# Patient Record
Sex: FEMALE | Race: WHITE | NOT HISPANIC OR LATINO | Employment: FULL TIME | URBAN - METROPOLITAN AREA
[De-identification: names, ages, dates, MRNs, and addresses within clinical notes are randomized per-mention and may not be internally consistent; named-entity substitution may affect disease eponyms.]

---

## 2018-10-08 ENCOUNTER — TRANSFERRED RECORDS (OUTPATIENT)
Dept: HEALTH INFORMATION MANAGEMENT | Facility: CLINIC | Age: 48
End: 2018-10-08

## 2018-10-10 ENCOUNTER — TELEPHONE (OUTPATIENT)
Dept: SLEEP MEDICINE | Facility: HOSPITAL | Age: 48
End: 2018-10-10

## 2018-10-16 ENCOUNTER — OFFICE VISIT (OUTPATIENT)
Dept: SLEEP MEDICINE | Facility: HOSPITAL | Age: 48
End: 2018-10-16
Attending: INTERNAL MEDICINE
Payer: COMMERCIAL

## 2018-10-16 DIAGNOSIS — R09.02 HYPOXIA: Primary | ICD-10-CM

## 2018-10-16 NOTE — PROGRESS NOTES
Patient picked up over night oximeter numberSL-2. Patient was instructed on use of device. Patient verbalized understanding.     Patient will return device tomorrow by: noon    Patient picked this up in [place]

## 2018-10-16 NOTE — MR AVS SNAPSHOT
"              After Visit Summary   10/16/2018    Michelle Cabrera    MRN: [id number]           Patient Information     Date Of Birth          1970        Visit Information        Provider Department      10/16/2018 11:15 AM [facility]        Today's Diagnoses     Hypoxia    -  1       Follow-ups after your visit        Your next 10 appointments already scheduled     Oct 17, 2018 11:00 AM CDT   Oximetry Drop Off with [facility]   [facility] ([facility] )    [address] [place] [id number]   [phone]              Who to contact     If you have questions or need follow up information about today's clinic visit or your schedule please contact [facility] directly at [phone].  Normal or non-critical lab and imaging results will be communicated to you by [employer], letter or phone within 4 business days after the clinic has received the results. If you do not hear from us within 7 days, please contact the clinic through [employer] or phone. If you have a critical or abnormal lab result, we will notify you by phone as soon as possible.  Submit refill requests through [employer] or call your pharmacy and they will forward the refill request to us. Please allow 3 business days for your refill to be completed.          Additional Information About Your Visit        [employer] Information     [employer] lets you send messages to your doctor, view your test results, renew your prescriptions, schedule appointments and more. To sign up, go to [email or web address] . Click on \"Log in\" on the left side of the screen, which will take you to the Welcome page. Then click on \"Sign up Now\" on the right side of the page.     You will be asked to enter the access code listed below, as well as some personal information. Please follow the directions to create your username and password.     Your access code is: [email or web address]  Expires: 2019 11:52 AM     Your access code will  in 90 days. " If you need help or a new code, please call your [facility] clinic or [phone].        Care EveryWhere ID     This is your Care EveryWhere ID. This could be used by other organizations to access your [facility] medical records  [id number]         Blood Pressure from Last 3 Encounters:   No data found for BP    Weight from Last 3 Encounters:   No data found for Wt              Today, you had the following     No orders found for display       Primary Care Provider Office Phone # Fax #    Cynthia Dailey PA-C [phone]       [facility] [address] [place]        Equal Access to Services     [facility]: Hadii aad ku hadasho Soomaali, waaxda luqadaha, qaybta kaalmada adeegyada, dakota ludwig adeeg candy soriano . So [facility] [phone].    ATENCIÓN: Si habla español, tiene a contreras disposición servicios gratuitos de asistencia lingüística. Llame al [phone].    We comply with applicable federal civil rights laws and [place] laws. We do not discriminate on the basis of race, color, national origin, age, disability, sex, sexual orientation, or gender identity.            Thank you!     Thank you for choosing [facility]  for your care. Our goal is always to provide you with excellent care. Hearing back from our patients is one way we can continue to improve our services. Please take a few minutes to complete the written survey that you may receive in the mail after your visit with us. Thank you!             Your Updated Medication List - Protect others around you: Learn how to safely use, store and throw away your medicines at www.disposemymeds.org.      Notice  As of 10/16/2018 11:52 AM    You have not been prescribed any medications.

## 2018-10-23 ENCOUNTER — DOCUMENTATION ONLY (OUTPATIENT)
Dept: SLEEP MEDICINE | Facility: HOSPITAL | Age: 48
End: 2018-10-23
Attending: INTERNAL MEDICINE
Payer: COMMERCIAL

## 2018-10-23 DIAGNOSIS — R09.02 HYPOXIA: Primary | ICD-10-CM

## 2018-10-23 NOTE — PROGRESS NOTES
Patient returned the oximeeter and the data was downloaded and the report sent to scan and to Dr. Flores

## 2021-07-28 ENCOUNTER — MEDICAL CORRESPONDENCE (OUTPATIENT)
Dept: INTERVENTIONAL RADIOLOGY/VASCULAR | Facility: HOSPITAL | Age: 51
End: 2021-07-28

## 2021-08-10 ENCOUNTER — HOSPITAL ENCOUNTER (OUTPATIENT)
Facility: HOSPITAL | Age: 51
End: 2021-08-10
Attending: RADIOLOGY | Admitting: RADIOLOGY
Payer: COMMERCIAL

## 2021-09-10 ENCOUNTER — HOSPITAL ENCOUNTER (OUTPATIENT)
Dept: INTERVENTIONAL RADIOLOGY/VASCULAR | Facility: HOSPITAL | Age: 51
Discharge: HOME | End: 2021-09-10
Attending: PAIN MEDICINE | Admitting: RADIOLOGY
Payer: COMMERCIAL

## 2021-09-10 DIAGNOSIS — M54.50 LOW BACK PAIN: ICD-10-CM

## 2021-09-10 PROCEDURE — 250N000011 HC RX IP 250 OP 636: Performed by: RADIOLOGY

## 2021-09-10 PROCEDURE — 64494 INJ PARAVERT F JNT L/S 2 LEV: CPT | Mod: 50

## 2021-09-10 PROCEDURE — 250N000009 HC RX 250: Performed by: RADIOLOGY

## 2021-09-10 RX ORDER — ROPIVACAINE HYDROCHLORIDE 10 MG/ML
10 INJECTION EPIDURAL; INFILTRATION; PERINEURAL ONCE
Status: COMPLETED | OUTPATIENT
Start: 2021-09-10 | End: 2021-09-10

## 2021-09-10 RX ADMIN — ROPIVACAINE HYDROCHLORIDE 6 ML: 10 INJECTION, SOLUTION EPIDURAL at 14:28

## 2021-09-10 RX ADMIN — LIDOCAINE HYDROCHLORIDE 10 ML: 10 INJECTION, SOLUTION EPIDURAL; INFILTRATION; INTRACAUDAL; PERINEURAL at 14:27

## 2021-09-10 NOTE — PROGRESS NOTES
Medial Branch Blocks Workup Discharge Instructions    To obtain authorization for a Rhizotomy (Radiofrequency Ablation), we are required to document the percent of relief of the Medial Branch Block injection after one hour.  Therefore, please call and report this to us an hour after your procedure.  If no one is available to answer, it will go to voicemail:  Please leave your name, phone number, and the relief you have obtained from your injection.        Report the percentage of pain relief you received from your medial branch block.   Such as 100%, 90%, 80%, 70%, 60%, 50% etc.   Please do not leave a range of relief; we require a specific percentage.   Without this information, your injection pain relief will not be documented as required for your insurance company. Calling is a crucial step of the Medial Branch Block injection and Rhizotomy workup.      Please call [phone] at 3:25 PM    Thank you,  The Interventional Care Team

## 2021-09-17 RX ORDER — LOSARTAN POTASSIUM AND HYDROCHLOROTHIAZIDE 12.5; 1 MG/1; MG/1
1 TABLET ORAL
COMMUNITY

## 2021-09-17 RX ORDER — DESOGESTREL AND ETHINYL ESTRADIOL 0.15-0.03
KIT ORAL
COMMUNITY
Start: 2021-08-09

## 2021-09-30 ENCOUNTER — HOSPITAL ENCOUNTER (OUTPATIENT)
Dept: INTERVENTIONAL RADIOLOGY/VASCULAR | Facility: HOSPITAL | Age: 51
End: 2021-09-30
Attending: PAIN MEDICINE | Admitting: RADIOLOGY
Payer: COMMERCIAL

## 2021-09-30 ENCOUNTER — HOSPITAL ENCOUNTER (OUTPATIENT)
Facility: HOSPITAL | Age: 51
Discharge: HOME | End: 2021-09-30
Attending: RADIOLOGY | Admitting: RADIOLOGY
Payer: COMMERCIAL

## 2021-09-30 DIAGNOSIS — M54.50 LOW BACK PAIN: ICD-10-CM

## 2021-09-30 PROCEDURE — 250N000009 HC RX 250: Performed by: RADIOLOGY

## 2021-09-30 PROCEDURE — 250N000011 HC RX IP 250 OP 636: Performed by: RADIOLOGY

## 2021-09-30 PROCEDURE — 64494 INJ PARAVERT F JNT L/S 2 LEV: CPT

## 2021-09-30 RX ORDER — LIDOCAINE 40 MG/G
CREAM TOPICAL ONCE
Status: COMPLETED | OUTPATIENT
Start: 2021-09-30 | End: 2021-09-30

## 2021-09-30 RX ORDER — ROPIVACAINE HYDROCHLORIDE 10 MG/ML
10 INJECTION EPIDURAL; INFILTRATION; PERINEURAL ONCE
Status: COMPLETED | OUTPATIENT
Start: 2021-09-30 | End: 2021-09-30

## 2021-09-30 RX ADMIN — LIDOCAINE HYDROCHLORIDE 8 ML: 10 INJECTION, SOLUTION EPIDURAL; INFILTRATION; INTRACAUDAL; PERINEURAL at 14:15

## 2021-09-30 RX ADMIN — LIDOCAINE 5 APPLICATION.: 40 CREAM TOPICAL at 14:15

## 2021-09-30 RX ADMIN — ROPIVACAINE HYDROCHLORIDE 6 ML: 10 INJECTION, SOLUTION EPIDURAL at 14:16

## 2021-09-30 NOTE — PROGRESS NOTES
Medial Branch Blocks Workup Discharge Instructions    To obtain authorization for a Rhizotomy (Radiofrequency Ablation), we are required to document the percent of relief of the Medial Branch Block injection after one hour.  Therefore, please call and report this to us an hour after your procedure.  If no one is available to answer, it will go to voicemail:  Please leave your name, phone number, and the relief you have obtained from your injection.        Report the percentage of pain relief you received from your medial branch block.   Such as 100%, 90%, 80%, 70%, 60%, 50% etc.   Please do not leave a range of relief; we require a specific percentage.   Without this information, your injection pain relief will not be documented as required for your insurance company. Calling is a crucial step of the Medial Branch Block injection and Rhizotomy workup.      Please call [phone] at 3:15 PM    Thank you,  The Interventional Care Team

## 2021-10-07 ENCOUNTER — TRANSFERRED RECORDS (OUTPATIENT)
Dept: HEALTH INFORMATION MANAGEMENT | Facility: HOSPITAL | Age: 51
End: 2021-10-07

## 2021-10-07 LAB
ALT SERPL-CCNC: 75 U/L (ref 18–65)
AST SERPL-CCNC: 82 U/L (ref 10–30)
CHOLESTEROL (EXTERNAL): 193 MG/DL
CREATININE (EXTERNAL): 0.79 MG/DL (ref 0.7–1.2)
GFR ESTIMATED (EXTERNAL): >60 ML/MIN/1.73M2
GLUCOSE (EXTERNAL): 91 MG/DL (ref 60–99)
HDLC SERPL-MCNC: 49 MG/DL
LDL CHOLESTEROL CALCULATED (EXTERNAL): 117 MG/DL
POTASSIUM (EXTERNAL): 4.7 MMOL/L (ref 3.5–5.1)
TRIGLYCERIDES (EXTERNAL): 136 MG/DL
TSH SERPL-ACNC: 2.58 MIU/ML (ref 0.35–4.8)

## 2021-10-11 ENCOUNTER — TRANSFERRED RECORDS (OUTPATIENT)
Dept: HEALTH INFORMATION MANAGEMENT | Facility: HOSPITAL | Age: 51
End: 2021-10-11

## 2021-10-13 ENCOUNTER — ANESTHESIA EVENT (OUTPATIENT)
Dept: SURGERY | Facility: HOSPITAL | Age: 51
End: 2021-10-13
Payer: COMMERCIAL

## 2021-10-13 ENCOUNTER — DOCUMENTATION ONLY (OUTPATIENT)
Dept: INTERVENTIONAL RADIOLOGY/VASCULAR | Facility: HOSPITAL | Age: 51
End: 2021-10-13

## 2021-10-13 RX ORDER — SODIUM CHLORIDE 9 MG/ML
INJECTION, SOLUTION INTRAVENOUS CONTINUOUS
Status: CANCELLED | OUTPATIENT
Start: 2021-10-13

## 2021-10-13 RX ORDER — SODIUM CHLORIDE, SODIUM LACTATE, POTASSIUM CHLORIDE, CALCIUM CHLORIDE 600; 310; 30; 20 MG/100ML; MG/100ML; MG/100ML; MG/100ML
1000 INJECTION, SOLUTION INTRAVENOUS CONTINUOUS
Status: CANCELLED | OUTPATIENT
Start: 2021-10-13

## 2021-10-13 RX ORDER — LIDOCAINE 40 MG/G
CREAM TOPICAL
Status: CANCELLED | OUTPATIENT
Start: 2021-10-13

## 2021-10-14 ENCOUNTER — APPOINTMENT (OUTPATIENT)
Dept: LAB | Facility: HOSPITAL | Age: 51
End: 2021-10-14
Attending: RADIOLOGY
Payer: COMMERCIAL

## 2021-10-14 ENCOUNTER — HOSPITAL ENCOUNTER (OUTPATIENT)
Dept: INTERVENTIONAL RADIOLOGY/VASCULAR | Facility: HOSPITAL | Age: 51
End: 2021-10-14
Attending: PAIN MEDICINE | Admitting: RADIOLOGY
Payer: COMMERCIAL

## 2021-10-14 ENCOUNTER — HOSPITAL ENCOUNTER (OUTPATIENT)
Facility: HOSPITAL | Age: 51
Discharge: HOME | End: 2021-10-14
Attending: RADIOLOGY | Admitting: RADIOLOGY
Payer: COMMERCIAL

## 2021-10-14 ENCOUNTER — ANESTHESIA (OUTPATIENT)
Dept: SURGERY | Facility: HOSPITAL | Age: 51
End: 2021-10-14
Payer: COMMERCIAL

## 2021-10-14 VITALS
OXYGEN SATURATION: 97 % | HEART RATE: 83 BPM | WEIGHT: 293 LBS | TEMPERATURE: 97.5 F | RESPIRATION RATE: 18 BRPM | BODY MASS INDEX: 47.09 KG/M2 | HEIGHT: 66 IN | DIASTOLIC BLOOD PRESSURE: 70 MMHG | SYSTOLIC BLOOD PRESSURE: 141 MMHG

## 2021-10-14 VITALS — HEART RATE: 80 BPM

## 2021-10-14 DIAGNOSIS — M47.816 SPONDYLOSIS OF LUMBAR REGION WITHOUT MYELOPATHY OR RADICULOPATHY: Primary | ICD-10-CM

## 2021-10-14 DIAGNOSIS — M54.50 LOW BACK PAIN: ICD-10-CM

## 2021-10-14 PROCEDURE — 272N000228 XR LUMBAR RADIOFREQ ABLATION BILATERAL

## 2021-10-14 PROCEDURE — 258N000003 HC RX IP 258 OP 636: Performed by: RADIOLOGY

## 2021-10-14 PROCEDURE — 999N000141 HC STATISTIC PRE-PROCEDURE NURSING ASSESSMENT

## 2021-10-14 PROCEDURE — 250N000011 HC RX IP 250 OP 636: Performed by: RADIOLOGY

## 2021-10-14 PROCEDURE — 250N000011 HC RX IP 250 OP 636: Performed by: NURSE ANESTHETIST, CERTIFIED REGISTERED

## 2021-10-14 PROCEDURE — 250N000009 HC RX 250: Performed by: NURSE ANESTHETIST, CERTIFIED REGISTERED

## 2021-10-14 PROCEDURE — 370N000017 HC ANESTHESIA TECHNICAL FEE, PER MIN

## 2021-10-14 PROCEDURE — 96372 THER/PROPH/DIAG INJ SC/IM: CPT | Performed by: RADIOLOGY

## 2021-10-14 PROCEDURE — 250N000009 HC RX 250: Performed by: RADIOLOGY

## 2021-10-14 PROCEDURE — 710N000012 HC RECOVERY PHASE 2, PER MINUTE

## 2021-10-14 PROCEDURE — 64636 DESTROY L/S FACET JNT ADDL: CPT

## 2021-10-14 PROCEDURE — 64635 DESTROY LUMB/SAC FACET JNT: CPT | Performed by: NURSE ANESTHETIST, CERTIFIED REGISTERED

## 2021-10-14 RX ORDER — MEPERIDINE HYDROCHLORIDE 25 MG/ML
12.5 INJECTION INTRAMUSCULAR; INTRAVENOUS; SUBCUTANEOUS
Status: DISCONTINUED | OUTPATIENT
Start: 2021-10-14 | End: 2021-10-14 | Stop reason: HOSPADM

## 2021-10-14 RX ORDER — LIDOCAINE HYDROCHLORIDE 10 MG/ML
INJECTION, SOLUTION EPIDURAL; INFILTRATION; INTRACAUDAL; PERINEURAL
Status: DISPENSED
Start: 2021-10-14 | End: 2021-10-14

## 2021-10-14 RX ORDER — ONDANSETRON 4 MG/1
4 TABLET, ORALLY DISINTEGRATING ORAL EVERY 30 MIN PRN
Status: DISCONTINUED | OUTPATIENT
Start: 2021-10-14 | End: 2021-10-14 | Stop reason: HOSPADM

## 2021-10-14 RX ORDER — LIDOCAINE 40 MG/G
CREAM TOPICAL
Status: DISCONTINUED | OUTPATIENT
Start: 2021-10-14 | End: 2021-10-14 | Stop reason: HOSPADM

## 2021-10-14 RX ORDER — OXYCODONE AND ACETAMINOPHEN 5; 325 MG/1; MG/1
1-2 TABLET ORAL EVERY 6 HOURS PRN
Qty: 40 TABLET | Refills: 0 | Status: SHIPPED | OUTPATIENT
Start: 2021-10-14 | End: 2021-10-19

## 2021-10-14 RX ORDER — METHYLPREDNISOLONE ACETATE 80 MG/ML
160 INJECTION, SUSPENSION INTRA-ARTICULAR; INTRALESIONAL; INTRAMUSCULAR; SOFT TISSUE ONCE
Status: COMPLETED | OUTPATIENT
Start: 2021-10-14 | End: 2021-10-14

## 2021-10-14 RX ORDER — SODIUM CHLORIDE 9 MG/ML
INJECTION, SOLUTION INTRAVENOUS CONTINUOUS
Status: DISCONTINUED | OUTPATIENT
Start: 2021-10-14 | End: 2021-10-14 | Stop reason: HOSPADM

## 2021-10-14 RX ORDER — NALOXONE HYDROCHLORIDE 0.4 MG/ML
0.4 INJECTION, SOLUTION INTRAMUSCULAR; INTRAVENOUS; SUBCUTANEOUS
Status: DISCONTINUED | OUTPATIENT
Start: 2021-10-14 | End: 2021-10-14 | Stop reason: HOSPADM

## 2021-10-14 RX ORDER — ONDANSETRON 2 MG/ML
4 INJECTION INTRAMUSCULAR; INTRAVENOUS EVERY 30 MIN PRN
Status: DISCONTINUED | OUTPATIENT
Start: 2021-10-14 | End: 2021-10-14 | Stop reason: HOSPADM

## 2021-10-14 RX ORDER — ESOMEPRAZOLE MAGNESIUM 40 MG/1
40 CAPSULE, DELAYED RELEASE ORAL
COMMUNITY

## 2021-10-14 RX ORDER — SODIUM CHLORIDE, SODIUM LACTATE, POTASSIUM CHLORIDE, CALCIUM CHLORIDE 600; 310; 30; 20 MG/100ML; MG/100ML; MG/100ML; MG/100ML
INJECTION, SOLUTION INTRAVENOUS CONTINUOUS
Status: DISCONTINUED | OUTPATIENT
Start: 2021-10-14 | End: 2021-10-14 | Stop reason: HOSPADM

## 2021-10-14 RX ORDER — NALOXONE HYDROCHLORIDE 0.4 MG/ML
0.2 INJECTION, SOLUTION INTRAMUSCULAR; INTRAVENOUS; SUBCUTANEOUS
Status: DISCONTINUED | OUTPATIENT
Start: 2021-10-14 | End: 2021-10-14 | Stop reason: HOSPADM

## 2021-10-14 RX ORDER — FENTANYL CITRATE 50 UG/ML
25 INJECTION, SOLUTION INTRAMUSCULAR; INTRAVENOUS
Status: DISCONTINUED | OUTPATIENT
Start: 2021-10-14 | End: 2021-10-14 | Stop reason: HOSPADM

## 2021-10-14 RX ORDER — LIDOCAINE HYDROCHLORIDE 20 MG/ML
INJECTION, SOLUTION INFILTRATION; PERINEURAL PRN
Status: DISCONTINUED | OUTPATIENT
Start: 2021-10-14 | End: 2021-10-14

## 2021-10-14 RX ORDER — LIDOCAINE 40 MG/G
CREAM TOPICAL
Status: DISPENSED
Start: 2021-10-14 | End: 2021-10-14

## 2021-10-14 RX ORDER — HYDRALAZINE HYDROCHLORIDE 20 MG/ML
2.5-5 INJECTION INTRAMUSCULAR; INTRAVENOUS EVERY 10 MIN PRN
Status: DISCONTINUED | OUTPATIENT
Start: 2021-10-14 | End: 2021-10-14 | Stop reason: HOSPADM

## 2021-10-14 RX ORDER — SODIUM CHLORIDE, SODIUM LACTATE, POTASSIUM CHLORIDE, CALCIUM CHLORIDE 600; 310; 30; 20 MG/100ML; MG/100ML; MG/100ML; MG/100ML
1000 INJECTION, SOLUTION INTRAVENOUS CONTINUOUS
Status: DISCONTINUED | OUTPATIENT
Start: 2021-10-14 | End: 2021-10-14 | Stop reason: HOSPADM

## 2021-10-14 RX ORDER — ALBUTEROL SULFATE 0.83 MG/ML
2.5 SOLUTION RESPIRATORY (INHALATION) EVERY 4 HOURS PRN
Status: DISCONTINUED | OUTPATIENT
Start: 2021-10-14 | End: 2021-10-14 | Stop reason: HOSPADM

## 2021-10-14 RX ORDER — PROPOFOL 10 MG/ML
INJECTION, EMULSION INTRAVENOUS PRN
Status: DISCONTINUED | OUTPATIENT
Start: 2021-10-14 | End: 2021-10-14

## 2021-10-14 RX ADMIN — METHYLPREDNISOLONE ACETATE 160 MG: 80 INJECTION, SUSPENSION INTRA-ARTICULAR; INTRALESIONAL; INTRAMUSCULAR; SOFT TISSUE at 08:58

## 2021-10-14 RX ADMIN — SODIUM CHLORIDE, POTASSIUM CHLORIDE, SODIUM LACTATE AND CALCIUM CHLORIDE: 600; 310; 30; 20 INJECTION, SOLUTION INTRAVENOUS at 07:44

## 2021-10-14 RX ADMIN — PROPOFOL 60 MG: 10 INJECTION, EMULSION INTRAVENOUS at 08:43

## 2021-10-14 RX ADMIN — PROPOFOL 40 MG: 10 INJECTION, EMULSION INTRAVENOUS at 08:42

## 2021-10-14 RX ADMIN — PROPOFOL 50 MG: 10 INJECTION, EMULSION INTRAVENOUS at 08:41

## 2021-10-14 RX ADMIN — PROPOFOL 40 MG: 10 INJECTION, EMULSION INTRAVENOUS at 08:32

## 2021-10-14 RX ADMIN — LIDOCAINE HYDROCHLORIDE 35 ML: 10 INJECTION, SOLUTION EPIDURAL; INFILTRATION; INTRACAUDAL; PERINEURAL at 09:09

## 2021-10-14 RX ADMIN — PROPOFOL 50 MG: 10 INJECTION, EMULSION INTRAVENOUS at 09:01

## 2021-10-14 RX ADMIN — PROPOFOL 50 MG: 10 INJECTION, EMULSION INTRAVENOUS at 08:37

## 2021-10-14 RX ADMIN — PROPOFOL 40 MG: 10 INJECTION, EMULSION INTRAVENOUS at 08:26

## 2021-10-14 RX ADMIN — PROPOFOL 50 MG: 10 INJECTION, EMULSION INTRAVENOUS at 08:39

## 2021-10-14 RX ADMIN — PROPOFOL 100 MG: 10 INJECTION, EMULSION INTRAVENOUS at 08:55

## 2021-10-14 RX ADMIN — PROPOFOL 60 MG: 10 INJECTION, EMULSION INTRAVENOUS at 08:36

## 2021-10-14 RX ADMIN — LIDOCAINE HYDROCHLORIDE 40 MG: 20 INJECTION, SOLUTION INFILTRATION; PERINEURAL at 08:26

## 2021-10-14 RX ADMIN — PROPOFOL 20 MG: 10 INJECTION, EMULSION INTRAVENOUS at 08:29

## 2021-10-14 RX ADMIN — PROPOFOL 50 MG: 10 INJECTION, EMULSION INTRAVENOUS at 09:03

## 2021-10-14 RX ADMIN — PROPOFOL 30 MG: 10 INJECTION, EMULSION INTRAVENOUS at 09:08

## 2021-10-14 RX ADMIN — PROPOFOL 50 MG: 10 INJECTION, EMULSION INTRAVENOUS at 08:30

## 2021-10-14 RX ADMIN — PROPOFOL 40 MG: 10 INJECTION, EMULSION INTRAVENOUS at 08:28

## 2021-10-14 RX ADMIN — PROPOFOL 50 MG: 10 INJECTION, EMULSION INTRAVENOUS at 08:58

## 2021-10-14 RX ADMIN — PROPOFOL 20 MG: 10 INJECTION, EMULSION INTRAVENOUS at 09:11

## 2021-10-14 RX ADMIN — PROPOFOL 50 MG: 10 INJECTION, EMULSION INTRAVENOUS at 09:05

## 2021-10-14 ASSESSMENT — COPD QUESTIONNAIRES: COPD: 0

## 2021-10-14 ASSESSMENT — MIFFLIN-ST. JEOR: SCORE: 1983.47

## 2021-10-14 NOTE — ANESTHESIA POSTPROCEDURE EVALUATION
Patient: Michelle Cabrera    Procedure: Procedure(s):  RHIZOTOMY       Diagnosis:Low back pain [M54.50]  Diagnosis Additional Information: No value filed.    Anesthesia Type:  MAC    Note:  Disposition: Outpatient   Postop Pain Control: Uneventful            Sign Out: Well controlled pain   PONV: No   Neuro/Psych: Uneventful            Sign Out: Acceptable/Baseline neuro status   Airway/Respiratory: Uneventful            Sign Out: Acceptable/Baseline resp. status   CV/Hemodynamics: Uneventful            Sign Out: Acceptable CV status; No obvious hypovolemia; No obvious fluid overload   Other NRE: NONE   DID A NON-ROUTINE EVENT OCCUR? No           Last vitals:  Vitals Value Taken Time   BP     Temp     Pulse     Resp     SpO2         Electronically Signed By: NORBERT Mims CRNA  October 14, 2021  10:29 AM

## 2021-10-14 NOTE — OR NURSING
Patient and responsible adult given discharge instructions with no questions regarding instructions. Wali score 20/20. Denies pain.  Discharged from unit via walking. Patient discharged to home with . Tolerating PO intake. Band aids remain CDI.

## 2021-10-14 NOTE — ANESTHESIA CARE TRANSFER NOTE
Patient: Michelle Cabrera    Procedure: Procedure(s):  RHIZOTOMY       Diagnosis: Low back pain [M54.50]  Diagnosis Additional Information: No value filed.    Anesthesia Type:   MAC     Note:      Level of Consciousness: awake  Oxygen Supplementation: room air    Independent Airway: airway patency satisfactory and stable  Dentition: dentition unchanged  Vital Signs Stable: post-procedure vital signs reviewed and stable  Report to RN Given: handoff report given  Patient transferred to: Phase II    Handoff Report: Identifed the Patient, Identified the Reponsible Provider, Reviewed the pertinent medical history, Discussed the surgical course, Reviewed Intra-OP anesthesia mangement and issues during anesthesia, Set expectations for post-procedure period and Allowed opportunity for questions and acknowledgement of understanding      Vitals:  Vitals Value Taken Time   /75 10/14/21 0925   Temp     Pulse 74 10/14/21 0925   Resp 16 10/14/21 0920   SpO2 98 % 10/14/21 0928   Vitals shown include unvalidated device data.    Electronically Signed By: NORBERT Mims CRNA  October 14, 2021  9:29 AM

## 2021-10-14 NOTE — ANESTHESIA PREPROCEDURE EVALUATION
Anesthesia Pre-Procedure Evaluation    Patient: Michelle Cabrera   MRN: [id number] : 1970        Preoperative Diagnosis: Low back pain [M54.50]    Procedure : Procedure(s):  RHIZOTOMY          No past medical history on file.   No past surgical history on file.   Allergies   Allergen Reactions     Erythromycin Other (See Comments)     hx of yeast infection w/use     No Clinical Screening - See Comments      Had a reaction to sulfa in an eye drop       Social History     Tobacco Use     Smoking status: Not on file   Substance Use Topics     Alcohol use: Not on file      Wt Readings from Last 1 Encounters:   No data found for Wt        Anesthesia Evaluation   Pt has had prior anesthetic.     History of anesthetic complications  - PONV.      ROS/MED HX  ENT/Pulmonary:     (+) sleep apnea, uses CPAP,  (-) COPD   Neurologic:  - neg neurologic ROS     Cardiovascular:     (+) hypertension-----    METS/Exercise Tolerance:     Hematologic:     (+) history of blood transfusion, no previous transfusion reaction,     Musculoskeletal:   (+) arthritis,     GI/Hepatic: Comment: Fatty liver    (+) GERD, Asymptomatic on medication, liver disease,     Renal/Genitourinary:  - neg Renal ROS     Endo:     (+) Obesity,     Psychiatric/Substance Use:  - neg psychiatric ROS     Infectious Disease:  - neg infectious disease ROS     Malignancy:       Other:  - neg other ROS          Physical Exam    Airway  airway exam normal      Mallampati: I   TM distance: > 3 FB   Neck ROM: full     Respiratory Devices and Support         Dental  no notable dental history         Cardiovascular   cardiovascular exam normal       Rhythm and rate: regular and normal     Pulmonary   pulmonary exam normal        breath sounds clear to auscultation   (+) decreased breath sounds           OUTSIDE LABS:  CBC: No results found for: WBC, HGB, HCT, PLT  BMP: No results found for: NA, POTASSIUM, CHLORIDE, CO2, BUN, CR, GLC  COAGS: No results found for:  PTT, INR, FIBR  POC: No results found for: BGM, HCG, HCGS  HEPATIC: No results found for: ALBUMIN, PROTTOTAL, ALT, AST, GGT, ALKPHOS, BILITOTAL, BILIDIRECT, KIANA  OTHER: No results found for: PH, LACT, A1C, GAURAV, PHOS, MAG, LIPASE, AMYLASE, TSH, T4, T3, CRP, SED    Anesthesia Plan    ASA Status:  3   NPO Status:  NPO Appropriate    Anesthesia Type: MAC.     - Reason for MAC: chronic cardiopulmonary disease, straight local not clinically adequate              Consents    Anesthesia Plan(s) and associated risks, benefits, and realistic alternatives discussed. Questions answered and patient/representative(s) expressed understanding.     - Discussed with:  Patient      - Extended Intubation/Ventilatory Support Discussed: Yes.      - Patient is DNR/DNI Status: No    Use of blood products discussed: No .     Postoperative Care            Comments:     10-7-21            NORBERT Mims CRNA

## 2021-10-14 NOTE — PROGRESS NOTES
Procedure: rhizo, bilateral    There were  no complications and patient has no symptoms..      Tolerated procedure well.    Radiologist:Dr. Kyler Trujillo, RN

## 2021-10-27 LAB
ALBUMIN (URINE) MG/SPEC: 4 MG/L
ALBUMIN/CREATININE RATIO: 3 MG/GM (ref 0–30)
CREATININE (URINE): 106.6 MG/DL

## 2022-01-19 ENCOUNTER — TELEPHONE (OUTPATIENT)
Dept: INTERVENTIONAL RADIOLOGY/VASCULAR | Facility: HOSPITAL | Age: 52
End: 2022-01-19
Payer: COMMERCIAL

## 2022-01-19 NOTE — TELEPHONE ENCOUNTER
3 months POST RHIZOTOMY CALL    Procedure: bilateral Lumbar Rhizotomy L5-S1, L4-5 and L3-4 facet joints .  Radiologist(s): Dr. Florentino Chávez  Date of Procedure: 10/14/21    I responded to the patient's questions/concerns.    Relief of pain from Rhizotomy    A = 90%  A- = 85%  B = 80%  B- = 75%  C = 70%  C- = 65%  D = 60%  D- = 50%  F = less than 50%    Where is the pain? Across the low back  Does the pain radiate anywhere?  no  If so, where does it stop? n/a  Is this new pain? No    Do you feel this procedure was beneficial? yes    How long did you feel relief  and what percentage relief do you feel you have gotten? 100% relief for one month    When did you pain start returning? One month post  What has your pain at now?6/10        Giselle Riddle

## 2022-04-20 ENCOUNTER — TELEPHONE (OUTPATIENT)
Dept: INTERVENTIONAL RADIOLOGY/VASCULAR | Facility: HOSPITAL | Age: 52
End: 2022-04-20
Payer: COMMERCIAL

## 2022-04-20 NOTE — TELEPHONE ENCOUNTER
6 months POST RHIZOTOMY CALL    Procedure:    bilateral Lumbar Rhizotomy L5-S1, L4-5 and L3-4 facet joints .  Radiologist(s):        Dr. Florentino Chávez  Date of Procedure: 10/14/21    I responded to the patient's questions/concerns.    Relief of pain from Rhizotomy    A = 90%  A- = 85%  B = 80%  B- = 75%  C = 70%  C- = 65%  D = 60%  D- = 50%  F = less than 50%    Where is the pain? Pain starts just below waistline and radiates down to just above the tailbone. Patient describes pain to be entire lumbar area along with some sharp pain on outsides of both thighs. Pain level and location/severity does change depending on what she is doing.   Does the pain radiate anywhere? Pain is in entire lumbar spine area mainly  If so, where does it stop? n/a  Is this new pain? No    Do you feel this procedure was beneficial?  Yes    How long did you feel relief  and what percentage relief do you feel you have gotten? 100% relief for one month then pain returned. Patient states over time she noticed long term this procedure has made her pain more tolerable by taking away about 30% discomfort. Sharp pain that use to go all the way down legs to foot is now only targeted at times on outsides of thighs and is more of an ache. Patient is noting pain is currently about 30% better.    When did you pain start returning? One month post, pain returned and then seemed to gradually relieve severity of pain over time.  What has your pain at now? With ibuprofen/motrin 2/10, when walking pain becomes and 8/10 and then as she moves around a little more pain lessens to a 5/10.    Patient would like to have this procedure done again, call with recommendation.        Giselle Riddle

## 2022-05-02 ENCOUNTER — TELEPHONE (OUTPATIENT)
Dept: GENERAL RADIOLOGY | Facility: HOSPITAL | Age: 52
End: 2022-05-02
Payer: COMMERCIAL

## 2023-03-21 ENCOUNTER — MEDICAL CORRESPONDENCE (OUTPATIENT)
Dept: ULTRASOUND IMAGING | Facility: HOSPITAL | Age: 53
End: 2023-03-21

## 2023-03-30 ENCOUNTER — MEDICAL CORRESPONDENCE (OUTPATIENT)
Dept: ULTRASOUND IMAGING | Facility: HOSPITAL | Age: 53
End: 2023-03-30

## 2023-03-30 ENCOUNTER — TELEPHONE (OUTPATIENT)
Dept: INTERVENTIONAL RADIOLOGY/VASCULAR | Facility: HOSPITAL | Age: 53
End: 2023-03-30

## 2023-04-14 ENCOUNTER — TELEPHONE (OUTPATIENT)
Dept: INTERVENTIONAL RADIOLOGY/VASCULAR | Facility: HOSPITAL | Age: 53
End: 2023-04-14

## 2023-04-14 RX ORDER — DEXTROSE MONOHYDRATE 25 G/50ML
25-50 INJECTION, SOLUTION INTRAVENOUS
Status: CANCELLED | OUTPATIENT
Start: 2023-04-14

## 2023-04-14 RX ORDER — SODIUM CHLORIDE 9 MG/ML
INJECTION, SOLUTION INTRAVENOUS CONTINUOUS PRN
Status: CANCELLED | OUTPATIENT
Start: 2023-04-14

## 2023-04-14 RX ORDER — LIDOCAINE 40 MG/G
CREAM TOPICAL
Status: CANCELLED | OUTPATIENT
Start: 2023-04-14

## 2023-04-14 RX ORDER — NICOTINE POLACRILEX 4 MG
15-30 LOZENGE BUCCAL
Status: CANCELLED | OUTPATIENT
Start: 2023-04-14

## 2023-04-17 ENCOUNTER — HOSPITAL ENCOUNTER (OUTPATIENT)
Dept: ULTRASOUND IMAGING | Facility: HOSPITAL | Age: 53
Discharge: HOME | End: 2023-04-17
Attending: OTOLARYNGOLOGY | Admitting: RADIOLOGY
Payer: COMMERCIAL

## 2023-04-17 ENCOUNTER — HOSPITAL ENCOUNTER (OUTPATIENT)
Facility: HOSPITAL | Age: 53
Discharge: HOME | End: 2023-04-17
Attending: RADIOLOGY | Admitting: RADIOLOGY
Payer: COMMERCIAL

## 2023-04-17 VITALS
SYSTOLIC BLOOD PRESSURE: 167 MMHG | OXYGEN SATURATION: 97 % | RESPIRATION RATE: 20 BRPM | HEART RATE: 93 BPM | DIASTOLIC BLOOD PRESSURE: 90 MMHG

## 2023-04-17 DIAGNOSIS — E04.1 NONTOXIC SINGLE THYROID NODULE: ICD-10-CM

## 2023-04-17 PROCEDURE — 88173 CYTOPATH EVAL FNA REPORT: CPT | Mod: TC | Performed by: OTOLARYNGOLOGY

## 2023-04-17 PROCEDURE — 88173 CYTOPATH EVAL FNA REPORT: CPT | Mod: 26 | Performed by: PATHOLOGY

## 2023-04-17 PROCEDURE — 10005 FNA BX W/US GDN 1ST LES: CPT

## 2023-04-17 PROCEDURE — 250N000009 HC RX 250: Performed by: RADIOLOGY

## 2023-04-17 PROCEDURE — 88172 CYTP DX EVAL FNA 1ST EA SITE: CPT | Mod: 26 | Performed by: PATHOLOGY

## 2023-04-17 RX ORDER — DEXTROSE MONOHYDRATE 25 G/50ML
25-50 INJECTION, SOLUTION INTRAVENOUS
Status: DISCONTINUED | OUTPATIENT
Start: 2023-04-17 | End: 2023-04-18 | Stop reason: HOSPADM

## 2023-04-17 RX ORDER — SODIUM CHLORIDE 9 MG/ML
INJECTION, SOLUTION INTRAVENOUS CONTINUOUS PRN
Status: DISCONTINUED | OUTPATIENT
Start: 2023-04-17 | End: 2023-04-18 | Stop reason: HOSPADM

## 2023-04-17 RX ORDER — NICOTINE POLACRILEX 4 MG
15-30 LOZENGE BUCCAL
Status: DISCONTINUED | OUTPATIENT
Start: 2023-04-17 | End: 2023-04-18 | Stop reason: HOSPADM

## 2023-04-17 RX ORDER — LIDOCAINE HYDROCHLORIDE 10 MG/ML
10-20 INJECTION, SOLUTION EPIDURAL; INFILTRATION; INTRACAUDAL; PERINEURAL ONCE
Status: COMPLETED | OUTPATIENT
Start: 2023-04-17 | End: 2023-04-17

## 2023-04-17 RX ORDER — MELOXICAM 10 MG/1
10 CAPSULE ORAL DAILY
COMMUNITY

## 2023-04-17 RX ORDER — LIDOCAINE 40 MG/G
CREAM TOPICAL
Status: DISCONTINUED | OUTPATIENT
Start: 2023-04-17 | End: 2023-04-18 | Stop reason: HOSPADM

## 2023-04-17 RX ORDER — LIDOCAINE HYDROCHLORIDE 10 MG/ML
10 INJECTION, SOLUTION EPIDURAL; INFILTRATION; INTRACAUDAL; PERINEURAL ONCE
Status: DISCONTINUED | OUTPATIENT
Start: 2023-04-17 | End: 2023-04-18 | Stop reason: HOSPADM

## 2023-04-17 RX ADMIN — LIDOCAINE HYDROCHLORIDE 5 ML: 10 INJECTION, SOLUTION EPIDURAL; INFILTRATION; INTRACAUDAL; PERINEURAL at 11:42

## 2023-04-17 ASSESSMENT — ACTIVITIES OF DAILY LIVING (ADL)
ADLS_ACUITY_SCORE: 33

## 2023-04-17 NOTE — PROGRESS NOTES
Procedure: Fine Needle Biopsy, Thyroid, left    There were  no complications and patient has no symptoms..      Tolerated procedure well  .    Patient to US.  Consent complete.  Supine on Ultrasound table.      Radiologist:Dr Reveles    Time Out: Prior to the start of the procedure and with procedural staff participation, I verbally confirmed the patient s identity using two indicators, relevant allergies, that the procedure was appropriate and matched the consent or emergent situation, and that the correct equipment/implants were available. Immediately prior to starting the procedure I conducted the Time Out with the procedural staff and re-confirmed the patient s name, procedure, and site/side. (The Joint Commission universal protocol was followed.)  Yes    Position:  supine    Pain:  0    Sedation:None. Local Anesthestic used  No sedation    Estimated Blood Loss: None     Condition: Stable    Comments: See dictated procedure note for full details     Francine Iqbal RN

## 2023-04-17 NOTE — IP AVS SNAPSHOT
After Visit Summary Template Not Found    This Print Group is only intended to be used in the After Visit Summary and can only be used in a report that uses a released After Visit Summary Template.                       MRN:[id number]                      After Visit Summary   4/17/2023    Michelle Cabrera   MRN: [id number]           Visit Information        Department      4/17/2023 11:30 AM [facility]          Review of your medicines       Notice    Cannot display discharge medications because the patient has not yet been admitted.           Protect others around you: Learn how to safely use, store and throw away your medicines at www.disposemymeds.org.       Follow-ups after your visit       Your next 10 appointments already scheduled    Apr 17, 2023 11:30 AM  (Arrive by 11:15 AM)  US BIOPSY THYROID FINE NEEDLE ASPIRATION with HIUS1, HIIRRAD, HIXRRN  HI ULTRASOUND ([facility] ) [address] [place] [id number]  [phone]   How do I prepare for my exam? (Food and drink instructions)  No Food and Drink Restrictions.    How do I prepare for my exam? (Other instructions)  IF YOUR DOCTOR ALSO PRESCRIBED SEDATION DURING THE EXAM (medicine to help you relax): You will receive separate instructions about driving, eating, and additional tests that may be necessary prior to your exam day.    What should I wear: Wear comfortable clothes.    How long does the exam take: Aspiration (no sedation treatment takes about an hour).  For paracentesis, thoracentesis or sedation plan to spend at least three hours at the hospital.    What should I bring: Bring a list of your medicines, including vitamins, minerals and over-the-counter drugs. It is safest to leave personal items at home. Bring your 's license or photo ID and your insurance card. For the safety of your family, please do not bring any children with you unless they are accompanied by another adult who can watch them  during your imaging exam.    Do I need a :  No  is needed.    What do I need to tell my doctor:  Tell your doctor in advance:  * If you are or may be pregnant.  * If you are taking Coumadin (or any other blood thinners) 5 days prior to the exam for any special instructions.  * If you are diabetic to determine if your insulin needs have to be adjusted for the exam.    What should I do after the exam: Take it easy the rest of the day. You can return to normal activities the next day.    What is this test: This test uses a long, thin needle or tube to remove tissue, fluid, or other cells from your body. Pictures from an ultrasound will guide the needle to the right place. (Ultrasound uses sound waves to create pictures of the body on a video screen. You will not feel the sound waves.)    * A biopsy removes tissue or other cells from the body; we send the tissue or cells to a lab for testing.  * Paracentesis removes fluid from the belly (abdomen) to relieve pressure, to test the fluid or both.  * Thoracentesis removes fluid from the sac around the lungs to relieve pressure, to test the fluid or both.  * Aspiration removes fluid from any part of the body, then the fluid is tested for disease or infection.    Who should I call with questions: If you have any questions, please call the Imaging Department where you will have your exam. Directions, parking instructions, and other information are available on our website, [email or web address].        Care Instructions       Further instructions from your care team         Thyroid Fine Needle Aspiration (FNA) Biopsy  The thyroid is a gland at the front of the neck. A thyroid fine needle aspiration (FNA) biopsy is a procedure to take out a small piece of tissue from your thyroid gland. The tissue is taken out with a small, hollow needle. The sample is sent to a lab where it's looked at to find a diagnosis.   Why thyroid FNA biopsy is done  Hard nodules can sometimes  form inside the thyroid gland. You may notice a small bump in the gland area. Thyroid nodules are common. The nodules are often not dangerous. But in some cases, they can be thyroid cancer. A thyroid FNA biopsy can test for cancer.   Risks of thyroid FNA biopsy  All procedures have some risks. The risks of thyroid FNA biopsy include:  Bleeding at the biopsy site  Infection  Damage to areas near the thyroid (rare)  Need for a repeat biopsy if the test result is in doubt  Getting ready for your procedure  Talk with your healthcare provider about how to get ready. Tell them about all the medicines you take. This includes over-the-counter medicines such as ibuprofen, vitamins, herbs, and other supplements. You may need to stop taking some medicines before the procedure, such as blood thinners and aspirin. You should be able to eat and drink normally before the procedure.   On the day of your procedure  Your procedure may be done in a hospital or a medical clinic. You should be able to go home the same day. The procedure often goes like this:   You may be given a medicine to help you relax, if needed. Arrange for a ride home if you are given medicines that make you sleepy.      A local anesthetic (numbing medicine) may be injected in the area in the front of your neck. Because the biopsy needle is so small, this may not be needed. You will most likely be lying on your back for the procedure, with a pillow under your shoulders to extend your neck.  Your healthcare provider may use an ultrasound machine during the biopsy. This machine uses sound waves and a computer to show live images of the tissues in your neck. This helps your healthcare provider guide the needle to the right spot. A gel will be put on your neck. This helps the ultrasound wand maintain contact with your skin and enhances the signal made by the sound waves.   The biopsy area will be cleaned. A thin, fine (narrow) needle will be put through your skin and  into your thyroid gland. You may feel a small amount of pain or pressure. The needle will be gently pushed into the nodule. A syringe attached to the needle will use gentle suction to take out a small piece of tissue from the nodule. This process may be done several times in the same nodule to get different samples from all parts of the nodule. You will need to be very still. You should not cough, talk, or swallow while the needle is put in.   The needle will then be taken out. A small bandage will be put on the needle insertion site. The tissue will be sent to a lab to be looked at for signs of cancer.  Sometimes the healthcare provider will use the ultrasound wand again 15 to 30 minutes later. This is to check that there is no bleeding or swelling where the biopsy was done.   After your procedure  You ll likely be able to go home that day. You'll likely be able to go back to your normal activities right away. You can take off your bandage within a few hours.   The site of the biopsy may be sore for a day or 2 after the procedure. You can take over-the-counter pain medicine, such as acetaminophen, as needed. Follow any other instructions that your healthcare provider gives you.   Follow-up care  Ask your healthcare provider when to expect to get your results from the biopsy. It may take several days. In some cases, the biopsy result may be inconclusive. This means the lab can t be sure if your nodule is cancer. You may need a repeat biopsy or surgery.   If your thyroid nodule is not cancer, you may not need any treatment. Your healthcare provider may want to keep track of your nodule. You may need another biopsy in the future.   If your nodule is cancer, you may need surgery or other treatment. Your healthcare provider will tell you more about what to expect and what needs to be done next.   When to call your healthcare provider  Call your healthcare provider right away if you have any of these:   Fever of 100.4 F  "(38 C) or higher  Redness, swelling, bleeding, or fluid leaking from the biopsy site  Pain around the biopsy site that gets worse  Be sure you know how to reach your healthcare provider after office hours and on weekends and holidays.   [employer] last reviewed this educational content on 2021-2022 [employer]. All rights reserved. This information is not intended as a substitute for professional medical care. Always follow your healthcare professional's instructions.          Additional Information About Your Visit       MyChart Information    [employer] lets you send messages to your doctor, view your test results, renew your prescriptions, schedule appointments and more. To sign up, go to [email or web address] . Click on \"Log in\" on the left side of the screen, which will take you to the Welcome page. Then click on \"Sign up Now\" on the right side of the page.     You will be asked to enter the access code listed below, as well as some personal information. Please follow the directions to create your username and password.     Your access code is: [email or web address]  Expires: 2023 10:49 AM     Your access code will  in 60 days. If you need help or a new code, please call your   [facility] clinic or [phone].       Care EveryWhere ID    This is your Care EveryWhere ID. This could be used by other organizations to access your [facility] medical records  [id number]        Primary Care Provider  Cynthia Dailey PA-C Office Phone #  [phone] Fax #  [phone]      Equal Access to Services    [facility]: Hadii aad dora hadasho Soomaali, waaxda luqadaha, qaybta kaalmada corina, dakota soriano . So [facility] [phone].    ATENCIÓN: Si habla español, tiene a contreras disposición servicios gratuitos de asistencia lingüística. Llame al [phone].    We comply with applicable federal and state civil rights laws, including the [place] " Human Rights Act. We do not discriminate on the basis of race, color, creed, Latter-day, national origin, marital status, age, disability, sex, sexual orientation, or gender identity.    If you would like an itemization of your charges they will now be available in [employer] 30 days after discharge. To access the itemized statements in [employer] go to billing/billing summary. From there select view account. There will be multiple tabs showing an overview of your account, detail, payments, and communications. From the communications tab you can see your monthly statements, your itemized statements, and any billing letters generated for your account. If you do not have a [employer] account and need help getting access please contact [employer] support at [phone].  If you would prefer to have your itemized statements mailed please contact our automated itemized bill request line at [phone] option  2.       Thank you!    Thank you for choosing [facility] for your care. Our goal is always to provide you with excellent care. Hearing back from our patients is one way we can continue to improve our services. Please take a few minutes to complete the written survey that you may receive in the mail after you visit with us. Thank you!         Medication List      Notice    Cannot display discharge medications because the patient has not yet been admitted.

## 2023-04-17 NOTE — IP AVS SNAPSHOT
[facility] RAD  [address] [address]  Phone: [phone]  Fax: [phone]                              After Visit Summary   4/17/2023    Michelle Cabrera   MRN: [id number]           After Visit Summary Signature Page    I have received my discharge instructions, and my questions have been answered. I have discussed any challenges I see with this plan with the nurse or doctor.    ..........................................................................................................................................  Patient/Patient Representative Signature      ..........................................................................................................................................  Patient Representative Print Name and Relationship to Patient    ..................................................               ................................................  Date                                   Time    ..........................................................................................................................................  Reviewed by Signature/Title    ...................................................              ..............................................  Date                                               Time    22EPIC Rev 08/18

## 2023-04-17 NOTE — DISCHARGE INSTRUCTIONS
Thyroid Fine Needle Aspiration (FNA) Biopsy  The thyroid is a gland at the front of the neck. A thyroid fine needle aspiration (FNA) biopsy is a procedure to take out a small piece of tissue from your thyroid gland. The tissue is taken out with a small, hollow needle. The sample is sent to a lab where it's looked at to find a diagnosis.   Why thyroid FNA biopsy is done  Hard nodules can sometimes form inside the thyroid gland. You may notice a small bump in the gland area. Thyroid nodules are common. The nodules are often not dangerous. But in some cases, they can be thyroid cancer. A thyroid FNA biopsy can test for cancer.   Risks of thyroid FNA biopsy  All procedures have some risks. The risks of thyroid FNA biopsy include:  Bleeding at the biopsy site  Infection  Damage to areas near the thyroid (rare)  Need for a repeat biopsy if the test result is in doubt  Getting ready for your procedure  Talk with your healthcare provider about how to get ready. Tell them about all the medicines you take. This includes over-the-counter medicines such as ibuprofen, vitamins, herbs, and other supplements. You may need to stop taking some medicines before the procedure, such as blood thinners and aspirin. You should be able to eat and drink normally before the procedure.   On the day of your procedure  Your procedure may be done in a hospital or a medical clinic. You should be able to go home the same day. The procedure often goes like this:   You may be given a medicine to help you relax, if needed. Arrange for a ride home if you are given medicines that make you sleepy.      A local anesthetic (numbing medicine) may be injected in the area in the front of your neck. Because the biopsy needle is so small, this may not be needed. You will most likely be lying on your back for the procedure, with a pillow under your shoulders to extend your neck.  Your healthcare provider may use an ultrasound machine during the biopsy. This  machine uses sound waves and a computer to show live images of the tissues in your neck. This helps your healthcare provider guide the needle to the right spot. A gel will be put on your neck. This helps the ultrasound wand maintain contact with your skin and enhances the signal made by the sound waves.   The biopsy area will be cleaned. A thin, fine (narrow) needle will be put through your skin and into your thyroid gland. You may feel a small amount of pain or pressure. The needle will be gently pushed into the nodule. A syringe attached to the needle will use gentle suction to take out a small piece of tissue from the nodule. This process may be done several times in the same nodule to get different samples from all parts of the nodule. You will need to be very still. You should not cough, talk, or swallow while the needle is put in.   The needle will then be taken out. A small bandage will be put on the needle insertion site. The tissue will be sent to a lab to be looked at for signs of cancer.  Sometimes the healthcare provider will use the ultrasound wand again 15 to 30 minutes later. This is to check that there is no bleeding or swelling where the biopsy was done.   After your procedure  You ll likely be able to go home that day. You'll likely be able to go back to your normal activities right away. You can take off your bandage within a few hours.   The site of the biopsy may be sore for a day or 2 after the procedure. You can take over-the-counter pain medicine, such as acetaminophen, as needed. Follow any other instructions that your healthcare provider gives you.   Follow-up care  Ask your healthcare provider when to expect to get your results from the biopsy. It may take several days. In some cases, the biopsy result may be inconclusive. This means the lab can t be sure if your nodule is cancer. You may need a repeat biopsy or surgery.   If your thyroid nodule is not cancer, you may not need any  treatment. Your healthcare provider may want to keep track of your nodule. You may need another biopsy in the future.   If your nodule is cancer, you may need surgery or other treatment. Your healthcare provider will tell you more about what to expect and what needs to be done next.   When to call your healthcare provider  Call your healthcare provider right away if you have any of these:   Fever of 100.4 F (38 C) or higher  Redness, swelling, bleeding, or fluid leaking from the biopsy site  Pain around the biopsy site that gets worse  Be sure you know how to reach your healthcare provider after office hours and on weekends and holidays.   [employer] last reviewed this educational content on 12/1/2021 2000-2022 [employer]. All rights reserved. This information is not intended as a substitute for professional medical care. Always follow your healthcare professional's instructions.

## 2023-04-18 ASSESSMENT — ACTIVITIES OF DAILY LIVING (ADL)
ADLS_ACUITY_SCORE: 33

## 2023-04-20 LAB
PATH REPORT.COMMENTS IMP SPEC: NO
PATH REPORT.COMMENTS IMP SPEC: NORMAL
PATH REPORT.COMMENTS IMP SPEC: NORMAL
PATH REPORT.FINAL DX SPEC: NORMAL
PATH REPORT.GROSS SPEC: NORMAL
PATH REPORT.MICROSCOPIC SPEC OTHER STN: NORMAL

## 2024-01-15 ENCOUNTER — MEDICAL CORRESPONDENCE (OUTPATIENT)
Dept: HEALTH INFORMATION MANAGEMENT | Facility: HOSPITAL | Age: 54
End: 2024-01-15

## 2025-01-16 ENCOUNTER — TELEPHONE (OUTPATIENT)
Dept: INTERVENTIONAL RADIOLOGY/VASCULAR | Facility: HOSPITAL | Age: 55
End: 2025-01-16

## 2025-01-21 ENCOUNTER — MEDICAL CORRESPONDENCE (OUTPATIENT)
Dept: INTERVENTIONAL RADIOLOGY/VASCULAR | Facility: HOSPITAL | Age: 55
End: 2025-01-21

## 2025-02-09 ENCOUNTER — HEALTH MAINTENANCE LETTER (OUTPATIENT)
Age: 55
End: 2025-02-09

## 2025-02-13 ENCOUNTER — ANESTHESIA EVENT (OUTPATIENT)
Dept: SURGERY | Facility: HOSPITAL | Age: 55
End: 2025-02-13
Payer: COMMERCIAL

## 2025-02-13 NOTE — ANESTHESIA PREPROCEDURE EVALUATION
Anesthesia Pre-Procedure Evaluation    Patient: Michelle Cabrera   MRN: [id number] : 1970        Procedure : Procedure(s):  Image Guided Lumbar Rhizotomy,Repeat Bilateral L3-L4,L4-L5,L5-S1          No past medical history on file.   No past surgical history on file.   Allergies   Allergen Reactions     Erythromycin Other (See Comments)     hx of yeast infection w/use     No Clinical Screening - See Comments      Had a reaction to sulfa in an eye drop       Social History     Tobacco Use     Smoking status: Not on file     Smokeless tobacco: Not on file   Substance Use Topics     Alcohol use: Not on file      Wt Readings from Last 1 Encounters:   10/14/21 135.2 kg (298 lb)        Anesthesia Evaluation   Pt has had prior anesthetic. Type: MAC.    History of anesthetic complications  - PONV.      ROS/MED HX  ENT/Pulmonary:     (+) sleep apnea, severe, uses CPAP,   EDUARDA risk factors,  hypertension, obese,        tobacco use, Past use,                       Neurologic:  - neg neurologic ROS     Cardiovascular:     (+)  hypertension- -   -  - -                                 Previous cardiac testing   Echo: Date: Results:    Stress Test:  Date: Results:    ECG Reviewed:  Date: 25 Results:  SR  Cath:  Date: Results:      METS/Exercise Tolerance:     Hematologic:     (+)       history of blood transfusion, no previous transfusion reaction,        Musculoskeletal: Comment: Lumbago with sciatica, left side  (+)  arthritis,             GI/Hepatic: Comment: Fatty liver    (+) GERD, Asymptomatic on medication,           liver disease,       Renal/Genitourinary:  - neg Renal ROS     Endo:     (+)               Obesity,       Psychiatric/Substance Use:  - neg psychiatric ROS     Infectious Disease:  - neg infectious disease ROS     Malignancy:       Other:  - neg other ROS    (+)  , H/O Chronic Pain,         Physical Exam    Airway        Mallampati: I   TM distance: > 3 FB   Neck ROM: full   Mouth opening: > 3  "cm    Respiratory Devices and Support         Dental       (+) Completely normal teeth      Cardiovascular          Rhythm and rate: regular and normal     Pulmonary           breath sounds clear to auscultation       OUTSIDE LABS:  CBC: No results found for: \"WBC\", \"HGB\", \"HCT\", \"PLT\"  BMP: No results found for: \"NA\", \"POTASSIUM\", \"CHLORIDE\", \"CO2\", \"BUN\", \"CR\", \"GLC\"  COAGS: No results found for: \"PTT\", \"INR\", \"FIBR\"  POC: No results found for: \"BGM\", \"HCG\", \"HCGS\"  HEPATIC: No results found for: \"ALBUMIN\", \"PROTTOTAL\", \"ALT\", \"AST\", \"GGT\", \"ALKPHOS\", \"BILITOTAL\", \"BILIDIRECT\", \"KIANA\"  OTHER: No results found for: \"PH\", \"LACT\", \"A1C\", \"GAURAV\", \"PHOS\", \"MAG\", \"LIPASE\", \"AMYLASE\", \"TSH\", \"T4\", \"T3\", \"CRP\", \"SED\"    Anesthesia Plan    ASA Status:  3    NPO Status:  NPO Appropriate    Anesthesia Type: MAC.     - Reason for MAC: straight local not clinically adequate   Induction: Propofol.   Maintenance: TIVA.        Consents    Anesthesia Plan(s) and associated risks, benefits, and realistic alternatives discussed. Questions answered and patient/representative(s) expressed understanding.     - Discussed: Risks, Benefits and Alternatives for BOTH SEDATION and the PROCEDURE were discussed     - Discussed with:  Patient      - Extended Intubation/Ventilatory Support Discussed: Yes.      - Patient is DNR/DNI Status: No     Use of blood products discussed: Yes.     Postoperative Care            Comments:    Other Comments: No recent info in chart hl - need to review 2/17 [facility]    2/19/25 1138am, no HP in chart, no updated weight since 2021. Discussed BMI with Dr Chávez requesting if patient BMI >45 consider prone general in OR. Per Kyler, unable to do in OR with portable C-arm. Will await to see if HP comes in.     Writer, Khadijah Banerjee CRNA called patient at 1201 in regards to expectations of anesthsia, height, and weight. Per patient, health has not changed except age. Pt reports 5ft 6.5 inches and 305 pounds. " Does wear CPAP for sleep apnea. Patient reports she does not mind being lightly sedated and have DR. Robertson use local anesthesia. Patient is aware that procedure will be aborted if it not safe to proceed at anytime and patient agrees. 7 minutes on phone with patient and patient appreciative of call. Will await HP.            NORBERT Adkins CNP    I have reviewed the pertinent notes and labs in the chart from the past 30 days. Any updates or changes from those notes are reflected in this note.    Clinically Significant Risk Factors Present on Admission

## 2025-02-17 ENCOUNTER — TRANSFERRED RECORDS (OUTPATIENT)
Dept: HEALTH INFORMATION MANAGEMENT | Facility: CLINIC | Age: 55
End: 2025-02-17

## 2025-02-19 ENCOUNTER — TELEPHONE (OUTPATIENT)
Dept: INTERVENTIONAL RADIOLOGY/VASCULAR | Facility: HOSPITAL | Age: 55
End: 2025-02-19

## 2025-02-19 RX ORDER — SODIUM CHLORIDE, SODIUM LACTATE, POTASSIUM CHLORIDE, CALCIUM CHLORIDE 600; 310; 30; 20 MG/100ML; MG/100ML; MG/100ML; MG/100ML
1000 INJECTION, SOLUTION INTRAVENOUS CONTINUOUS
Status: CANCELLED | OUTPATIENT
Start: 2025-02-19

## 2025-02-19 RX ORDER — LIDOCAINE 40 MG/G
CREAM TOPICAL
Status: CANCELLED | OUTPATIENT
Start: 2025-02-19

## 2025-02-19 RX ORDER — LIDOCAINE HYDROCHLORIDE 10 MG/ML
30 INJECTION, SOLUTION EPIDURAL; INFILTRATION; INTRACAUDAL; PERINEURAL ONCE
Status: CANCELLED | OUTPATIENT
Start: 2025-02-19 | End: 2025-02-19

## 2025-02-19 RX ORDER — SODIUM CHLORIDE 9 MG/ML
INJECTION, SOLUTION INTRAVENOUS CONTINUOUS
Status: CANCELLED | OUTPATIENT
Start: 2025-02-19

## 2025-02-19 RX ORDER — METHYLPREDNISOLONE ACETATE 80 MG/ML
80-160 INJECTION, SUSPENSION INTRA-ARTICULAR; INTRALESIONAL; INTRAMUSCULAR; SOFT TISSUE ONCE
Status: CANCELLED | OUTPATIENT
Start: 2025-02-19 | End: 2025-02-19

## 2025-02-19 ASSESSMENT — LIFESTYLE VARIABLES: TOBACCO_USE: 1

## 2025-02-19 NOTE — TELEPHONE ENCOUNTER
[facility] called at 1141 as we have not received the H&P yet. They are going to relay the information to Cynthia Dailey's care team and will fax that information to us.

## 2025-02-20 ENCOUNTER — HOSPITAL ENCOUNTER (OUTPATIENT)
Dept: INTERVENTIONAL RADIOLOGY/VASCULAR | Facility: HOSPITAL | Age: 55
Discharge: HOME | End: 2025-02-20
Attending: PHYSICIAN ASSISTANT
Payer: COMMERCIAL

## 2025-02-20 ENCOUNTER — HOSPITAL ENCOUNTER (OUTPATIENT)
Facility: HOSPITAL | Age: 55
Discharge: HOME | End: 2025-02-20
Attending: RADIOLOGY | Admitting: RADIOLOGY
Payer: COMMERCIAL

## 2025-02-20 ENCOUNTER — ANESTHESIA (OUTPATIENT)
Dept: SURGERY | Facility: HOSPITAL | Age: 55
End: 2025-02-20
Payer: COMMERCIAL

## 2025-02-20 VITALS
DIASTOLIC BLOOD PRESSURE: 71 MMHG | RESPIRATION RATE: 16 BRPM | OXYGEN SATURATION: 98 % | HEIGHT: 66 IN | BODY MASS INDEX: 47.09 KG/M2 | HEART RATE: 69 BPM | SYSTOLIC BLOOD PRESSURE: 133 MMHG | TEMPERATURE: 97 F | WEIGHT: 293 LBS

## 2025-02-20 VITALS — HEART RATE: 71 BPM

## 2025-02-20 DIAGNOSIS — M47.817 SPONDYLOSIS OF LUMBOSACRAL JOINT WITHOUT MYELOPATHY: Primary | ICD-10-CM

## 2025-02-20 DIAGNOSIS — M54.42 LUMBAGO WITH SCIATICA, LEFT SIDE: ICD-10-CM

## 2025-02-20 DIAGNOSIS — G89.29 OTHER CHRONIC PAIN: ICD-10-CM

## 2025-02-20 PROCEDURE — 96372 THER/PROPH/DIAG INJ SC/IM: CPT | Performed by: RADIOLOGY

## 2025-02-20 PROCEDURE — 258N000003 HC RX IP 258 OP 636: Performed by: NURSE PRACTITIONER

## 2025-02-20 PROCEDURE — 250N000009 HC RX 250: Performed by: NURSE ANESTHETIST, CERTIFIED REGISTERED

## 2025-02-20 PROCEDURE — 250N000009 HC RX 250: Performed by: RADIOLOGY

## 2025-02-20 PROCEDURE — 250N000011 HC RX IP 250 OP 636: Mod: JZ | Performed by: RADIOLOGY

## 2025-02-20 PROCEDURE — 272N000540 XR LUMBAR RADIOFREQ ABLATION BILATERAL

## 2025-02-20 PROCEDURE — 250N000011 HC RX IP 250 OP 636: Performed by: NURSE ANESTHETIST, CERTIFIED REGISTERED

## 2025-02-20 RX ORDER — SODIUM CHLORIDE, SODIUM LACTATE, POTASSIUM CHLORIDE, CALCIUM CHLORIDE 600; 310; 30; 20 MG/100ML; MG/100ML; MG/100ML; MG/100ML
INJECTION, SOLUTION INTRAVENOUS CONTINUOUS
Status: DISCONTINUED | OUTPATIENT
Start: 2025-02-20 | End: 2025-02-20 | Stop reason: HOSPADM

## 2025-02-20 RX ORDER — OXYCODONE AND ACETAMINOPHEN 5; 325 MG/1; MG/1
1 TABLET ORAL EVERY 6 HOURS PRN
Qty: 12 TABLET | Refills: 0 | Status: SHIPPED | OUTPATIENT
Start: 2025-02-20 | End: 2025-02-23

## 2025-02-20 RX ORDER — HYDROMORPHONE HYDROCHLORIDE 1 MG/ML
0.2 INJECTION, SOLUTION INTRAMUSCULAR; INTRAVENOUS; SUBCUTANEOUS EVERY 5 MIN PRN
Status: DISCONTINUED | OUTPATIENT
Start: 2025-02-20 | End: 2025-02-20 | Stop reason: HOSPADM

## 2025-02-20 RX ORDER — FENTANYL CITRATE 50 UG/ML
25 INJECTION, SOLUTION INTRAMUSCULAR; INTRAVENOUS EVERY 5 MIN PRN
Status: DISCONTINUED | OUTPATIENT
Start: 2025-02-20 | End: 2025-02-20 | Stop reason: HOSPADM

## 2025-02-20 RX ORDER — FENTANYL CITRATE 50 UG/ML
50 INJECTION, SOLUTION INTRAMUSCULAR; INTRAVENOUS EVERY 5 MIN PRN
Status: DISCONTINUED | OUTPATIENT
Start: 2025-02-20 | End: 2025-02-20 | Stop reason: HOSPADM

## 2025-02-20 RX ORDER — SODIUM CHLORIDE 9 MG/ML
INJECTION, SOLUTION INTRAVENOUS CONTINUOUS
Status: ACTIVE | OUTPATIENT
Start: 2025-02-20

## 2025-02-20 RX ORDER — SODIUM CHLORIDE, SODIUM LACTATE, POTASSIUM CHLORIDE, CALCIUM CHLORIDE 600; 310; 30; 20 MG/100ML; MG/100ML; MG/100ML; MG/100ML
1000 INJECTION, SOLUTION INTRAVENOUS CONTINUOUS
Status: ACTIVE | OUTPATIENT
Start: 2025-02-20

## 2025-02-20 RX ORDER — GABAPENTIN 300 MG/1
300 CAPSULE ORAL 2 TIMES DAILY
COMMUNITY

## 2025-02-20 RX ORDER — ONDANSETRON 2 MG/ML
4 INJECTION INTRAMUSCULAR; INTRAVENOUS EVERY 30 MIN PRN
Status: DISCONTINUED | OUTPATIENT
Start: 2025-02-20 | End: 2025-02-20 | Stop reason: HOSPADM

## 2025-02-20 RX ORDER — LIDOCAINE HYDROCHLORIDE 10 MG/ML
30 INJECTION, SOLUTION EPIDURAL; INFILTRATION; INTRACAUDAL; PERINEURAL ONCE
Status: COMPLETED | OUTPATIENT
Start: 2025-02-20 | End: 2025-02-20

## 2025-02-20 RX ORDER — PROPOFOL 10 MG/ML
INJECTION, EMULSION INTRAVENOUS CONTINUOUS PRN
Status: DISCONTINUED | OUTPATIENT
Start: 2025-02-20 | End: 2025-02-20

## 2025-02-20 RX ORDER — PROPOFOL 10 MG/ML
INJECTION, EMULSION INTRAVENOUS PRN
Status: DISCONTINUED | OUTPATIENT
Start: 2025-02-20 | End: 2025-02-20

## 2025-02-20 RX ORDER — LIDOCAINE HYDROCHLORIDE 20 MG/ML
INJECTION, SOLUTION INFILTRATION; PERINEURAL PRN
Status: DISCONTINUED | OUTPATIENT
Start: 2025-02-20 | End: 2025-02-20

## 2025-02-20 RX ORDER — LIDOCAINE 40 MG/G
CREAM TOPICAL
Status: ACTIVE | OUTPATIENT
Start: 2025-02-20

## 2025-02-20 RX ORDER — MULTIVIT WITH MINERALS/LUTEIN
1000 TABLET ORAL DAILY
COMMUNITY

## 2025-02-20 RX ORDER — LIDOCAINE HYDROCHLORIDE 10 MG/ML
30 INJECTION, SOLUTION EPIDURAL; INFILTRATION; INTRACAUDAL; PERINEURAL ONCE
OUTPATIENT
Start: 2025-02-20 | End: 2025-02-20

## 2025-02-20 RX ORDER — DEXAMETHASONE SODIUM PHOSPHATE 10 MG/ML
4 INJECTION, SOLUTION INTRAMUSCULAR; INTRAVENOUS
Status: DISCONTINUED | OUTPATIENT
Start: 2025-02-20 | End: 2025-02-20 | Stop reason: HOSPADM

## 2025-02-20 RX ORDER — NALOXONE HYDROCHLORIDE 0.4 MG/ML
0.1 INJECTION, SOLUTION INTRAMUSCULAR; INTRAVENOUS; SUBCUTANEOUS
Status: DISCONTINUED | OUTPATIENT
Start: 2025-02-20 | End: 2025-02-20 | Stop reason: HOSPADM

## 2025-02-20 RX ORDER — MULTIPLE VITAMINS W/ MINERALS TAB 9MG-400MCG
1 TAB ORAL DAILY
COMMUNITY

## 2025-02-20 RX ORDER — LIDOCAINE 40 MG/G
CREAM TOPICAL
Status: DISCONTINUED | OUTPATIENT
Start: 2025-02-20 | End: 2025-02-20 | Stop reason: HOSPADM

## 2025-02-20 RX ORDER — DEXMEDETOMIDINE HYDROCHLORIDE 4 UG/ML
INJECTION, SOLUTION INTRAVENOUS PRN
Status: DISCONTINUED | OUTPATIENT
Start: 2025-02-20 | End: 2025-02-20

## 2025-02-20 RX ORDER — LORATADINE 10 MG/1
10 TABLET ORAL DAILY
COMMUNITY

## 2025-02-20 RX ORDER — HYDROMORPHONE HYDROCHLORIDE 1 MG/ML
0.4 INJECTION, SOLUTION INTRAMUSCULAR; INTRAVENOUS; SUBCUTANEOUS EVERY 5 MIN PRN
Status: DISCONTINUED | OUTPATIENT
Start: 2025-02-20 | End: 2025-02-20 | Stop reason: HOSPADM

## 2025-02-20 RX ORDER — METHYLPREDNISOLONE ACETATE 80 MG/ML
80-160 INJECTION, SUSPENSION INTRA-ARTICULAR; INTRALESIONAL; INTRAMUSCULAR; SOFT TISSUE ONCE
Status: COMPLETED | OUTPATIENT
Start: 2025-02-20 | End: 2025-02-20

## 2025-02-20 RX ORDER — ONDANSETRON 4 MG/1
4 TABLET, ORALLY DISINTEGRATING ORAL EVERY 30 MIN PRN
Status: DISCONTINUED | OUTPATIENT
Start: 2025-02-20 | End: 2025-02-20 | Stop reason: HOSPADM

## 2025-02-20 RX ORDER — MULTIVIT-MIN/IRON/FOLIC ACID/K 18-600-40
600 CAPSULE ORAL ONCE
COMMUNITY

## 2025-02-20 RX ADMIN — PROPOFOL 20 MG: 10 INJECTION, EMULSION INTRAVENOUS at 08:26

## 2025-02-20 RX ADMIN — MIDAZOLAM 1 MG: 1 INJECTION INTRAMUSCULAR; INTRAVENOUS at 08:23

## 2025-02-20 RX ADMIN — DEXMEDETOMIDINE HYDROCHLORIDE 6 MCG: 4 INJECTION, SOLUTION INTRAVENOUS at 08:57

## 2025-02-20 RX ADMIN — PROPOFOL 20 MG: 10 INJECTION, EMULSION INTRAVENOUS at 09:04

## 2025-02-20 RX ADMIN — SODIUM CHLORIDE, POTASSIUM CHLORIDE, SODIUM LACTATE AND CALCIUM CHLORIDE: 600; 310; 30; 20 INJECTION, SOLUTION INTRAVENOUS at 08:00

## 2025-02-20 RX ADMIN — LIDOCAINE HYDROCHLORIDE 40 MG: 20 INJECTION, SOLUTION INFILTRATION; PERINEURAL at 08:23

## 2025-02-20 RX ADMIN — PROPOFOL 20 MG: 10 INJECTION, EMULSION INTRAVENOUS at 08:45

## 2025-02-20 RX ADMIN — PROPOFOL 50 MCG/KG/MIN: 10 INJECTION, EMULSION INTRAVENOUS at 08:23

## 2025-02-20 RX ADMIN — PROPOFOL 20 MG: 10 INJECTION, EMULSION INTRAVENOUS at 09:26

## 2025-02-20 RX ADMIN — PROPOFOL 20 MG: 10 INJECTION, EMULSION INTRAVENOUS at 09:06

## 2025-02-20 RX ADMIN — DEXMEDETOMIDINE HYDROCHLORIDE 6 MCG: 4 INJECTION, SOLUTION INTRAVENOUS at 09:06

## 2025-02-20 RX ADMIN — DEXMEDETOMIDINE HYDROCHLORIDE 6 MCG: 4 INJECTION, SOLUTION INTRAVENOUS at 09:23

## 2025-02-20 RX ADMIN — LIDOCAINE HYDROCHLORIDE 6 ML: 10 INJECTION, SOLUTION EPIDURAL; INFILTRATION; INTRACAUDAL; PERINEURAL at 09:44

## 2025-02-20 RX ADMIN — LIDOCAINE HYDROCHLORIDE 30 ML: 10 INJECTION, SOLUTION EPIDURAL; INFILTRATION; INTRACAUDAL; PERINEURAL at 09:43

## 2025-02-20 RX ADMIN — DEXMEDETOMIDINE HYDROCHLORIDE 6 MCG: 4 INJECTION, SOLUTION INTRAVENOUS at 08:50

## 2025-02-20 RX ADMIN — PROPOFOL 30 MG: 10 INJECTION, EMULSION INTRAVENOUS at 09:23

## 2025-02-20 RX ADMIN — PROPOFOL 20 MG: 10 INJECTION, EMULSION INTRAVENOUS at 08:57

## 2025-02-20 RX ADMIN — METHYLPREDNISOLONE ACETATE 160 MG: 80 INJECTION, SUSPENSION INTRA-ARTICULAR; INTRALESIONAL; INTRAMUSCULAR; SOFT TISSUE at 09:45

## 2025-02-20 RX ADMIN — PROPOFOL 20 MG: 10 INJECTION, EMULSION INTRAVENOUS at 08:37

## 2025-02-20 ASSESSMENT — ACTIVITIES OF DAILY LIVING (ADL)
ADLS_ACUITY_SCORE: 15

## 2025-02-20 NOTE — OR NURSING
Patient and responsible adult given discharge instructions with no questions regarding instructions. Wali score 20/20. Pain level 0/10.  Discharged from unit via ambulatory. Patient discharged to home.

## 2025-02-20 NOTE — SEDATION DOCUMENTATION
Procedure: image guided bilateral lumbar rhizotomy, L 3-4, L 4-5, L 5-S 1.    Radiologist:Dr. Chávez    Sedation:Monitored Anesthesia Care (MAC) administered and documented by Anesthesia Care Provider    Time Out: Prior to the start of the procedure and with procedural staff participation, I verbally confirmed the patient s identity using two indicators, relevant allergies, that the procedure was appropriate and matched the consent or emergent situation, and that the correct equipment/implants were available. Immediately prior to starting the procedure I conducted the Time Out with the procedural staff and re-confirmed the patient s name, procedure, and site/side. (The Joint Commission universal protocol was followed.)  Yes    Specimens collected and sent to lab: N/A.    Position:  prone    Tolerated procedure well.     Condition: Stable    Comments: See dictated procedure note for full details     Transferred back to same day surgery with anesthesia via cart.    Face to face report given with opportunity to observe patient.    Report given to Joan Monet RN   2/20/2025  9:59 AM

## 2025-02-20 NOTE — SEDATION DOCUMENTATION
Grounding patch removed from left posterior thigh. Skin remains clean, dry and intact. No redness or irritation. 8 bandaids placed on low back.

## 2025-02-20 NOTE — ANESTHESIA POSTPROCEDURE EVALUATION
Patient: Michelle Cabrera    Procedure: Procedure(s):  Image Guided Lumbar Rhizotomy,Repeat Bilateral L3-L4,L4-L5,L5-S1       Anesthesia Type:  MAC    Note:  Disposition: Outpatient   Postop Pain Control: Uneventful            Sign Out: Well controlled pain   PONV: No   Neuro/Psych: Uneventful            Sign Out: Acceptable/Baseline neuro status   Airway/Respiratory: Uneventful            Sign Out: Acceptable/Baseline resp. status   CV/Hemodynamics: Uneventful            Sign Out: Acceptable CV status; No obvious hypovolemia; No obvious fluid overload   Other NRE:    DID A NON-ROUTINE EVENT OCCUR?        Last vitals:  Vitals Value Taken Time   BP     Temp     Pulse     Resp     SpO2         Electronically Signed By: NORBERT Mata CRNA  February 20, 2025  11:11 AM

## 2025-02-20 NOTE — DISCHARGE INSTRUCTIONS
After Anesthesia (Sleep Medicine)  What should I do after anesthesia?  You should rest and relax for the next 24 hours. Avoid risky or difficult (strenuous) activity. A responsible adult should stay with you overnight.  Don't drive or use any heavy equipment for 24 hours. Even if you feel normal, your reactions may be affected by the sleep medicine given to you.  Don't drink alcohol or make any important decisions for 24 hours.  Slowly get back to your regular diet, as you feel able.  How should I expect to feel?  It's normal to feel dizzy, light-headed, or faint for up to a full day after anesthesia or while taking pain medicine. If this happens:   Sit down for a few minutes before standing.  Have someone help you when you get up to walk or use the bathroom.  If you have nausea (feel sick to your stomach) or vomit (throw up):   Drink clear liquids (such as apple juice, ginger ale, broth, or 7UP) until you feel better.  If you feel sick to your stomach, or you keep vomiting for 24 hours, please call the doctor.  What else should I know?  You might have a dry mouth, sore throat, muscle aches, or trouble sleeping. These should go away after 24 hours.  Please contact your doctor if you have any other symptoms that concern you, such as fever, pain, bleeding, fluid drainage, swelling, or headache, or if it's been over 8 to 10 hours and you still aren't able to pee (urinate).  If you have a history of sleep apnea, it's very important to use your CPAP machine for the next 24 hours when you nap or sleep.   For informational purposes only. Not to replace the advice of your health care provider. Copyright   2023 [employer]. All rights reserved. Clinically reviewed by Wai Thorne MD. Workspace [id number] - REV 09/23.Rhizotomy Discharge Instructions        Notify procedure physician:    ~Please contact the office: If you experience signs or symptoms of an infection: redness, swelling, drainage, fever (over  100.5*F) or if you have any change in your bowel or bladder control, have increased numbness, tingling or weakness in your feet, severe leg pain or arm symptoms. If you cannot reach the office, please proceed to the nearest Emergency Department (ER).    Medication:  ~You may resume taking your pre-injection medications immediately.  If you are taking a blood thinner please contact primary provider as when to resume.   Sedation:   ~If you received sedation during your procedure, you must have a responsible adult drive you home.  ~Avoid driving, operating heavy machinery, or making any major decisions for 24 hours after the procedure.     Diet:  ~Resume your previous diet    Activity  ~You may experience increased discomfort for 24-72 hours after the injection due to the irritation from the placement of the needle.  Limited activity and rest are recommended for this time period.  Avoid heavy lifting, unusual positions, vigorous exercise or other maneuvers which can exacerbate your pain. You may gradually resume regular activities as your discomfort subsides.  Maximum results may not be achieved until 2-12 weeks post procedure.    Care of injection site  ~Shower only (do not submerge body in water today).  It is common to have some pain and discomfort at the procedure site.  Use ice only for the first 24 hours. Apply ice to the area 20 minutes on then 20 minutes off. You may use moist heat or ice after 24 hours (whichever you prefer.) May removed band aids in the morning.       We will be contacting you to follow up on your procedure on the next business day.      Risks of radiofrequency denervation  More pain after the procedure for a period of time  Mild burning feeling that may last up to 3 weeks  Numbness in the area that may last up to 4 months    IMMEDIATELY FOLLOWING YOUR RHIZOTOMY: Do not drive or operate machinery for the first twenty four hours after surgery. Do not make any important decisions for twenty  four hours after surgery or while taking narcotic pain medications or sedatives. If you develop intractable nausea and vomiting or a severe headache please notify your doctor immediately.    FOLLOW-UP: You will contacted by the Interventional Radiology appointment at 3 months and 6 months to review the pain relief you received from the procedure. You do not need to follow up with anesthesia unless specifically instructed to do so.    QUESTIONS?: Please feel free to call your physician or the hospital  if you have any questions, and they will be happy to assist you.

## 2025-02-20 NOTE — ANESTHESIA CARE TRANSFER NOTE
Patient: Michelle Cabrera    Procedure: Procedure(s):  Image Guided Lumbar Rhizotomy,Repeat Bilateral L3-L4,L4-L5,L5-S1       Diagnosis: Lumbago with sciatica, left side [M54.42]  Other chronic pain [G89.29]  Diagnosis Additional Information: No value filed.    Anesthesia Type:   MAC     Note:    Oropharynx: oropharynx clear of all foreign objects and spontaneously breathing  Level of Consciousness: awake  Oxygen Supplementation: room air    Independent Airway: airway patency satisfactory and stable  Dentition: dentition unchanged  Vital Signs Stable: post-procedure vital signs reviewed and stable  Report to RN Given: handoff report given  Patient transferred to: Phase II    Handoff Report: Identifed the Patient, Identified the Reponsible Provider, Reviewed the pertinent medical history, Discussed the surgical course, Reviewed Intra-OP anesthesia mangement and issues during anesthesia, Set expectations for post-procedure period and Allowed opportunity for questions and acknowledgement of understanding  Vitals:  Vitals Value Taken Time   /55 02/20/25 0957   Temp     Pulse 72 02/20/25 0957   Resp     SpO2 97 % 02/20/25 0959   Vitals shown include unfiled device data.    Electronically Signed By: NORBERT Castaneda CRNA  February 20, 2025  10:00 AM

## 2025-02-20 NOTE — SEDATION DOCUMENTATION
Grounding patch placed to left posterior thigh. Lot # [id number]. [employer] biomed asset number [id number]. Skin on left posterior thigh clean, dry and intact. No redness or irritation.

## 2025-04-05 ENCOUNTER — HEALTH MAINTENANCE LETTER (OUTPATIENT)
Age: 55
End: 2025-04-05

## 2025-05-21 ENCOUNTER — TELEPHONE (OUTPATIENT)
Dept: INTERVENTIONAL RADIOLOGY/VASCULAR | Facility: HOSPITAL | Age: 55
End: 2025-05-21

## 2025-05-21 NOTE — TELEPHONE ENCOUNTER
3 months POST RHIZOTOMY CALL    Procedure: Bilateral L3-4, L4-5, L5-S1 Lumbar Rhizotomy.  Radiologist(s): Dr. Florentino Chávez  Date of Procedure: 2/20/25    The patient had no questions or concerns.    Relief of pain from Rhizotomy    A = 90%  A- = 85%  B = 80%  B- = 75%  C = 70%  C- = 65%  D = 60%  D- = 50%  F = less than 50%    Where is the pain? She still has pain in her back a the waistline, in the middle left side is worse does go to the right in the back  Does the pain radiate anywhere? Yes  If so, where does it stop?  It goes down the lateral side of the left thigh  Is this new pain? No    Do you feel this procedure was beneficial? It was for the first two weeks she was at 80% of relief.    How long did you feel relief  and what percentage relief do you feel you have gotten?  The reliefs she feels only last two weeks. Now at the 3 month f/up she is at 0% of relief.    When did you pain start returning? Two weeks after the procedure.    What has your pain at now? Pain level with long term activity is at 7/10        Emmy Barboza

## 2025-08-18 ENCOUNTER — TELEPHONE (OUTPATIENT)
Dept: INTERVENTIONAL RADIOLOGY/VASCULAR | Facility: HOSPITAL | Age: 55
End: 2025-08-18

## (undated) RX ORDER — FENTANYL CITRATE 50 UG/ML
INJECTION, SOLUTION INTRAMUSCULAR; INTRAVENOUS
Status: DISPENSED
Start: 2025-02-20

## (undated) RX ORDER — FENTANYL CITRATE-0.9 % NACL/PF 10 MCG/ML
PLASTIC BAG, INJECTION (ML) INTRAVENOUS
Status: DISPENSED
Start: 2025-02-20

## (undated) RX ORDER — DEXMEDETOMIDINE HYDROCHLORIDE 4 UG/ML
INJECTION, SOLUTION INTRAVENOUS
Status: DISPENSED
Start: 2025-02-20

## (undated) RX ORDER — PROPOFOL 10 MG/ML
INJECTION, EMULSION INTRAVENOUS
Status: DISPENSED
Start: 2025-02-20